# Patient Record
Sex: FEMALE | Race: OTHER | NOT HISPANIC OR LATINO
[De-identification: names, ages, dates, MRNs, and addresses within clinical notes are randomized per-mention and may not be internally consistent; named-entity substitution may affect disease eponyms.]

---

## 2017-04-04 ENCOUNTER — APPOINTMENT (OUTPATIENT)
Dept: INTERNAL MEDICINE | Facility: CLINIC | Age: 22
End: 2017-04-04

## 2017-04-04 VITALS
TEMPERATURE: 98.1 F | HEART RATE: 78 BPM | HEIGHT: 64 IN | DIASTOLIC BLOOD PRESSURE: 70 MMHG | WEIGHT: 204 LBS | BODY MASS INDEX: 34.83 KG/M2 | SYSTOLIC BLOOD PRESSURE: 115 MMHG

## 2017-04-04 DIAGNOSIS — Z78.9 OTHER SPECIFIED HEALTH STATUS: ICD-10-CM

## 2017-04-04 DIAGNOSIS — Z82.69 FAMILY HISTORY OF OTHER DISEASES OF THE MUSCULOSKELETAL SYSTEM AND CONNECTIVE TISSUE: ICD-10-CM

## 2017-04-04 DIAGNOSIS — Z00.00 ENCOUNTER FOR GENERAL ADULT MEDICAL EXAMINATION W/OUT ABNORMAL FINDINGS: ICD-10-CM

## 2017-04-04 DIAGNOSIS — Z83.3 FAMILY HISTORY OF DIABETES MELLITUS: ICD-10-CM

## 2017-04-04 DIAGNOSIS — Z11.3 ENCOUNTER FOR SCREENING FOR INFECTIONS WITH A PREDOMINANTLY SEXUAL MODE OF TRANSMISSION: ICD-10-CM

## 2017-04-05 LAB
25(OH)D3 SERPL-MCNC: 15.3 NG/ML
ALBUMIN SERPL ELPH-MCNC: 4.3 G/DL
ALP BLD-CCNC: 40 U/L
ALT SERPL-CCNC: 15 U/L
ANION GAP SERPL CALC-SCNC: 15 MMOL/L
AST SERPL-CCNC: 14 U/L
BASOPHILS # BLD AUTO: 0.02 K/UL
BASOPHILS NFR BLD AUTO: 0.2 %
BILIRUB SERPL-MCNC: 0.5 MG/DL
BUN SERPL-MCNC: 10 MG/DL
CALCIUM SERPL-MCNC: 9.5 MG/DL
CHLORIDE SERPL-SCNC: 101 MMOL/L
CHOLEST SERPL-MCNC: 167 MG/DL
CHOLEST/HDLC SERPL: 3.2 RATIO
CO2 SERPL-SCNC: 23 MMOL/L
CREAT SERPL-MCNC: 0.59 MG/DL
EOSINOPHIL # BLD AUTO: 0.07 K/UL
EOSINOPHIL NFR BLD AUTO: 0.7 %
GLUCOSE SERPL-MCNC: 88 MG/DL
HBA1C MFR BLD HPLC: 6 %
HBV SURFACE AB SER QL: NONREACTIVE
HBV SURFACE AG SER QL: NONREACTIVE
HCT VFR BLD CALC: 37.9 %
HCV AB SER QL: NONREACTIVE
HCV S/CO RATIO: 0.39 S/CO
HDLC SERPL-MCNC: 53 MG/DL
HGB BLD-MCNC: 12 G/DL
HIV1+2 AB SPEC QL IA.RAPID: NONREACTIVE
IMM GRANULOCYTES NFR BLD AUTO: 0.2 %
LDLC SERPL CALC-MCNC: 84 MG/DL
LYMPHOCYTES # BLD AUTO: 3.1 K/UL
LYMPHOCYTES NFR BLD AUTO: 30.3 %
MAN DIFF?: NORMAL
MCHC RBC-ENTMCNC: 27 PG
MCHC RBC-ENTMCNC: 31.7 GM/DL
MCV RBC AUTO: 85.4 FL
MONOCYTES # BLD AUTO: 0.43 K/UL
MONOCYTES NFR BLD AUTO: 4.2 %
NEUTROPHILS # BLD AUTO: 6.58 K/UL
NEUTROPHILS NFR BLD AUTO: 64.4 %
PLATELET # BLD AUTO: 394 K/UL
POTASSIUM SERPL-SCNC: 4.4 MMOL/L
PROT SERPL-MCNC: 7.5 G/DL
RBC # BLD: 4.44 M/UL
RBC # FLD: 13.8 %
SODIUM SERPL-SCNC: 139 MMOL/L
T PALLIDUM AB SER QL IA: NEGATIVE
TRIGL SERPL-MCNC: 152 MG/DL
TSH SERPL-ACNC: 3.24 UIU/ML
VIT B12 SERPL-MCNC: 334 PG/ML
WBC # FLD AUTO: 10.22 K/UL

## 2017-04-19 ENCOUNTER — APPOINTMENT (OUTPATIENT)
Dept: NEUROLOGY | Facility: CLINIC | Age: 22
End: 2017-04-19

## 2017-04-19 VITALS
DIASTOLIC BLOOD PRESSURE: 81 MMHG | WEIGHT: 203 LBS | HEIGHT: 62 IN | HEART RATE: 77 BPM | SYSTOLIC BLOOD PRESSURE: 125 MMHG | BODY MASS INDEX: 37.36 KG/M2

## 2017-04-19 DIAGNOSIS — G47.00 INSOMNIA, UNSPECIFIED: ICD-10-CM

## 2017-04-19 DIAGNOSIS — Z87.42 PERSONAL HISTORY OF OTHER DISEASES OF THE FEMALE GENITAL TRACT: ICD-10-CM

## 2017-07-06 ENCOUNTER — APPOINTMENT (OUTPATIENT)
Dept: INTERNAL MEDICINE | Facility: CLINIC | Age: 22
End: 2017-07-06

## 2017-11-17 ENCOUNTER — RESULT REVIEW (OUTPATIENT)
Age: 22
End: 2017-11-17

## 2018-11-05 ENCOUNTER — MOBILE ON CALL (OUTPATIENT)
Age: 23
End: 2018-11-05

## 2018-11-27 ENCOUNTER — RESULT REVIEW (OUTPATIENT)
Age: 23
End: 2018-11-27

## 2018-12-02 ENCOUNTER — EMERGENCY (EMERGENCY)
Facility: HOSPITAL | Age: 23
LOS: 1 days | Discharge: HOME | End: 2018-12-02
Attending: EMERGENCY MEDICINE
Payer: COMMERCIAL

## 2018-12-02 VITALS
TEMPERATURE: 98 F | OXYGEN SATURATION: 99 % | RESPIRATION RATE: 17 BRPM | DIASTOLIC BLOOD PRESSURE: 80 MMHG | HEART RATE: 77 BPM | SYSTOLIC BLOOD PRESSURE: 127 MMHG

## 2018-12-02 VITALS
OXYGEN SATURATION: 100 % | RESPIRATION RATE: 16 BRPM | SYSTOLIC BLOOD PRESSURE: 127 MMHG | DIASTOLIC BLOOD PRESSURE: 87 MMHG | HEART RATE: 68 BPM

## 2018-12-02 LAB
APPEARANCE UR: ABNORMAL
BACTERIA # UR AUTO: ABNORMAL
BILIRUB UR-MCNC: NEGATIVE — SIGNIFICANT CHANGE UP
COLOR SPEC: YELLOW — SIGNIFICANT CHANGE UP
DIFF PNL FLD: ABNORMAL
EPI CELLS # UR: 23 /HPF — HIGH
GLUCOSE UR QL: NEGATIVE — SIGNIFICANT CHANGE UP
HYALINE CASTS # UR AUTO: 0 /LPF — SIGNIFICANT CHANGE UP (ref 0–7)
KETONES UR-MCNC: NEGATIVE — SIGNIFICANT CHANGE UP
LEUKOCYTE ESTERASE UR-ACNC: ABNORMAL
NITRITE UR-MCNC: NEGATIVE — SIGNIFICANT CHANGE UP
PH UR: 6 — SIGNIFICANT CHANGE UP (ref 5–8)
PROT UR-MCNC: ABNORMAL
RBC CASTS # UR COMP ASSIST: 11 /HPF — HIGH (ref 0–4)
SP GR SPEC: 1.04 — HIGH (ref 1.01–1.02)
UROBILINOGEN FLD QL: NEGATIVE — SIGNIFICANT CHANGE UP
WBC UR QL: 30 /HPF — HIGH (ref 0–5)

## 2018-12-02 PROCEDURE — 96372 THER/PROPH/DIAG INJ SC/IM: CPT

## 2018-12-02 PROCEDURE — 99283 EMERGENCY DEPT VISIT LOW MDM: CPT | Mod: 25

## 2018-12-02 PROCEDURE — 81001 URINALYSIS AUTO W/SCOPE: CPT

## 2018-12-02 PROCEDURE — 99283 EMERGENCY DEPT VISIT LOW MDM: CPT

## 2018-12-02 RX ORDER — KETOROLAC TROMETHAMINE 30 MG/ML
30 SYRINGE (ML) INJECTION ONCE
Qty: 0 | Refills: 0 | Status: DISCONTINUED | OUTPATIENT
Start: 2018-12-02 | End: 2018-12-02

## 2018-12-02 RX ADMIN — Medication 30 MILLIGRAM(S): at 15:48

## 2018-12-02 NOTE — ED PROVIDER NOTE - MEDICAL DECISION MAKING DETAILS
MD Juni,Attending: pt seen. agree with above HPI/ROS/PE. appears well with normal neuro exam. No sxs except L sided and posterior headache. different than migraine sxss for her. Prior MRI showed "cyst"--? type. No urgent imaging needed. No infectious sxs. Good cervical range of motion. for symptomatic RX for what sounds like tension-type headache. Has neurologist and will followup this week. MD Juni,Attending: pt seen. agree with above HPI/ROS/PE. appears well with normal neuro exam. No sxs except L sided and posterior headache. different than migraine sxss for her. Prior MRI showed "cyst"--? type. No urgent imaging needed. No infectious sxs. Good cervical range of motion. for symptomatic RX for what sounds like tension-type headache. Has neurologist and will followup this week.    EM PGY1 Rita Rosas MD: 23F with PMHx of migraine, presents to the ED with complaints of L sided headache onset yesterday with secondary neck pain. Pt hemodynamically stable, afebrile. No neck stiffness, no FNDs. Pt is well appearing. Unlike usual migraines, likely tension HA affecting left frontooccipital region. Plan: pain control, f/u with neurologist MD Juni,Attending: pt seen. agree with above HPI/ROS/PE. appears well with normal neuro exam. No sxs except L sided and posterior headache. different than migraine sxss for her. Prior MRI showed "cyst"--? type. No urgent imaging needed. No infectious sxs. Good cervical range of motion. for symptomatic RX for what sounds like tension-type headache. Has neurologist and will followup this week.    EM PGY1 Rita Rosas MD: 23F with PMHx of migraine, presents to the ED with complaints of L sided headache onset yesterday with secondary neck pain. Pt hemodynamically stable, afebrile. No neck stiffness, no neuro findingss. Pt is well appearing. Unlike usual migraines, likely tension HA affecting left frontooccipital region. Plan: pain control, f/u with neurologist

## 2018-12-02 NOTE — ED PROVIDER NOTE - PLAN OF CARE
Thank you for visiting our Emergency Department, it has been a pleasure taking part in your healthcare.   You have been seen for headache.  You were treated with Toradol      Please take all discharge medications as prescribed below:    Take Motrin/Tylenol for pain as needed.  Take Motrin 600 mg (three 200 mg over the counter pills) every 8 hours as needed for moderate pain--take with food. Do not take this medication if you have a bleeding disorder, stomach or GI ulcer problems or liver disease. Take 2 regular strength (650mg) or 1 extra strength (500mg) of Tylenol. If you have any questions please consult a pharmacist or your PMD.     Follow up with your primary care provider in 24-48 hours.   Please return to the Emergency Department if you experience any new/worsening symptoms, including but are not limited to: unrelenting nausea, vomiting, syncope, headache that does not resolve, numbness or tingling, loss of sensation, loss of motor function, or any other concerning symptoms.

## 2018-12-02 NOTE — ED PROVIDER NOTE - CARE PLAN
Principal Discharge DX:	Acute non intractable tension-type headache Principal Discharge DX:	Acute non intractable tension-type headache  Assessment and plan of treatment:	Thank you for visiting our Emergency Department, it has been a pleasure taking part in your healthcare.   You have been seen for headache.  You were treated with Toradol      Please take all discharge medications as prescribed below:    Take Motrin/Tylenol for pain as needed.  Take Motrin 600 mg (three 200 mg over the counter pills) every 8 hours as needed for moderate pain--take with food. Do not take this medication if you have a bleeding disorder, stomach or GI ulcer problems or liver disease. Take 2 regular strength (650mg) or 1 extra strength (500mg) of Tylenol. If you have any questions please consult a pharmacist or your PMD.     Follow up with your primary care provider in 24-48 hours.   Please return to the Emergency Department if you experience any new/worsening symptoms, including but are not limited to: unrelenting nausea, vomiting, syncope, headache that does not resolve, numbness or tingling, loss of sensation, loss of motor function, or any other concerning symptoms.

## 2018-12-02 NOTE — ED ADULT NURSE NOTE - NSIMPLEMENTINTERV_GEN_ALL_ED
Implemented All Universal Safety Interventions:  [place] to call system. Call bell, personal items and telephone within reach. Instruct patient to call for assistance. Room bathroom lighting operational. Non-slip footwear when patient is off stretcher. Physically safe environment: no spills, clutter or unnecessary equipment. Stretcher in lowest position, wheels locked, appropriate side rails in place.

## 2018-12-02 NOTE — ED PROVIDER NOTE - CHPI ED SYMPTOMS NEG
no dyspnea, no dysuria, no hematuria, no recent travel, no numbness/tingling, no difficulty ambulating, no chest pain/no weakness/no fever/no blurred vision

## 2018-12-02 NOTE — ED ADULT NURSE NOTE - OBJECTIVE STATEMENT
Pt presents to ED awake, alert and ambulatory, c/o headache. Pt states her pain began yesterday and is located on the left side of her head, has since radiated to her neck and jaw. Pt states this pain does not feel like her normal migraines and were only mildly relieved with Excedrin. Pt reports nausea, vomiting and epistaxis yesterday. Pt had a recent MRI which showed a cyst. Full ROM in neck. No changes in vision, fever. Respirations even and unlabored.

## 2018-12-02 NOTE — ED PROVIDER NOTE - PROGRESS NOTE DETAILS
EM PGY1 Rita Rosas MD: Pt assessed at beside. Pt resting comfortably, pain controlled, pt questions answered. Vital signs stable. Will d/c. Follow up instructions given, importance of follow up emphasized, return to ED parameters reviewed and patient verbalized understanding.  All questions answered, all concerns addressed.

## 2018-12-02 NOTE — ED PROVIDER NOTE - OBJECTIVE STATEMENT
23F with PMHx of migraine, presents to the ED with complaints of L sided tension headache onset yesterday with secondary neck pain. Pt rates pain at 7 out of 10 and notes pain is unlike Hx of migraines. Admits to intermittent nausea, emesis and epistaxis yesterday. Recent outpatient MRI remarkable for cyst. Pt follows up with neurologist. Pt took Excedrin Migraine with mild relief yesterday. Notes that when she yawns, pain is exacerbated in her jaw. Denies fever, blurred vision, dyspnea, dysuria, hematuria, recent travel, weakness, numbness/tingling, difficulty ambulating, chest pain, or any other complaints at this time. LNMP Nov 16th, 2018. Sexually active. Denies Hx of pregnancy. Pt endorses Hx of thyroid US, unremarkable. 23F with PMHx of migraine, presents to the ED with complaints of L sided headache onset yesterday with secondary neck pain. Pt rates pain at 7 out of 10 and notes pain is unlike Hx of migraines. Admits to intermittent nausea, emesis and epistaxis yesterday. Recent outpatient MRI remarkable for cyst. Pt follows up with neurologist. Pt took Excedrin Migraine with mild relief yesterday. Notes that when she yawns, pain is exacerbated in her jaw. Denies fever, blurred vision, dyspnea, dysuria, hematuria, recent travel, weakness, numbness/tingling, difficulty ambulating, chest pain, or any other complaints at this time. LNMP Nov 16th, 2018. Sexually active. Denies Hx of pregnancy. Pt endorses Hx of thyroid US, unremarkable.

## 2020-02-27 ENCOUNTER — RESULT REVIEW (OUTPATIENT)
Age: 25
End: 2020-02-27

## 2020-06-22 ENCOUNTER — TRANSCRIPTION ENCOUNTER (OUTPATIENT)
Age: 25
End: 2020-06-22

## 2021-05-05 ENCOUNTER — RESULT REVIEW (OUTPATIENT)
Age: 26
End: 2021-05-05

## 2021-06-13 ENCOUNTER — EMERGENCY (EMERGENCY)
Facility: HOSPITAL | Age: 26
LOS: 1 days | Discharge: HOME | End: 2021-06-13
Attending: EMERGENCY MEDICINE
Payer: COMMERCIAL

## 2021-06-13 VITALS
SYSTOLIC BLOOD PRESSURE: 147 MMHG | HEART RATE: 87 BPM | DIASTOLIC BLOOD PRESSURE: 92 MMHG | OXYGEN SATURATION: 100 % | RESPIRATION RATE: 16 BRPM | TEMPERATURE: 98 F

## 2021-06-13 VITALS
RESPIRATION RATE: 20 BRPM | OXYGEN SATURATION: 98 % | HEART RATE: 102 BPM | HEIGHT: 63 IN | TEMPERATURE: 100 F | DIASTOLIC BLOOD PRESSURE: 94 MMHG | WEIGHT: 179.9 LBS | SYSTOLIC BLOOD PRESSURE: 144 MMHG

## 2021-06-13 LAB
HPIV3 RNA SPEC QL NAA+PROBE: DETECTED
RAPID RVP RESULT: DETECTED
SARS-COV-2 RNA SPEC QL NAA+PROBE: SIGNIFICANT CHANGE UP

## 2021-06-13 PROCEDURE — 99284 EMERGENCY DEPT VISIT MOD MDM: CPT | Mod: 25

## 2021-06-13 PROCEDURE — 71045 X-RAY EXAM CHEST 1 VIEW: CPT | Mod: 26

## 2021-06-13 PROCEDURE — 94640 AIRWAY INHALATION TREATMENT: CPT

## 2021-06-13 PROCEDURE — 0225U NFCT DS DNA&RNA 21 SARSCOV2: CPT

## 2021-06-13 PROCEDURE — 99284 EMERGENCY DEPT VISIT MOD MDM: CPT

## 2021-06-13 PROCEDURE — 71045 X-RAY EXAM CHEST 1 VIEW: CPT

## 2021-06-13 RX ORDER — FLUTICASONE PROPIONATE 50 MCG
2 SPRAY, SUSPENSION NASAL ONCE
Refills: 0 | Status: COMPLETED | OUTPATIENT
Start: 2021-06-13 | End: 2021-06-13

## 2021-06-13 RX ADMIN — Medication 2 SPRAY(S): at 21:18

## 2021-06-13 NOTE — ED PROVIDER NOTE - CLINICAL SUMMARY MEDICAL DECISION MAKING FREE TEXT BOX
Tunde PGY-3:  27yo F here w/ viral URI sx, fully vaccinated against covid doubt severe covid complications, however could still have covid. For epidemiological purposes will obtain swab, cxr as 6 days of sx to assess for bacterial PNA, if wnl anticipate d/c w/ pcp F/U

## 2021-06-13 NOTE — ED PROVIDER NOTE - NSFOLLOWUPINSTRUCTIONS_ED_ALL_ED_FT
(1) Follow up with your primary care physician as discussed  (2) Immediately seek care at your nearest emergency room if your worsen, persist, or do not resolve   (3) Take Tylenol (up to 1000mg or 1 g)  and/or Motrin (up to 600mg) up to every 6 hours as needed for pain.    YOU WERE SEEN IN THE ED FOR A LIKELY VIRAL ILLNESS.     YOU SHOULD SELF-QUARANTINE FOR 14 DAYS TO AVOID POTENTIAL SPREAD OF THE CORONAVIRUS.     YOU MAY USE TYLENOL AND/OR MOTRIN AS NEEDED FOR PAIN OR FEVERS.    RETURN TO THE ED IF YOU DEVELOP TROUBLE BREATHING.

## 2021-06-13 NOTE — ED ADULT NURSE NOTE - OBJECTIVE STATEMENT
Patient is a 26 yr old female who presents to the ED from home complaining of cold symptoms. Per patient she has been experiencing cold symptoms for the past 5 days and has taken (Z-pac, mucinex) and states she has no relief. Patient states now she has diahhrea and general body aches, with some minor n/v. Patient states her phlegm that she brings up is no longer green but clear, however she still feels uncomfortable. Patient states she has not seen her PMD but did go to  where her rapid covid was negative. Upon assessment, patient is AOx4, afebrile, breathing spontaneously on RA, abdomen soft/non tender, +pulses, skin intact and denies CP. All safety precautions maintains, call bell at bedside and will continue to monitor.

## 2021-06-13 NOTE — ED PROVIDER NOTE - OBJECTIVE STATEMENT
27yo F no pmhx here with runny nose and cough for 6x days    States came in today as she is having trouble sleeping due to congestion in nose despite using mucinex. Also with diarrhea. No f/c. Tolerating PO without issue. No CP, no SOB. 2x covid vaccine 2nd shot in late may.     No meds  no surgeries

## 2021-06-13 NOTE — ED PROVIDER NOTE - ATTENDING CONTRIBUTION TO CARE
Nemes - 27yo F no pmhx here with runny nose and cough for 6x days. Also with diarrhea. No f/c. Tolerating PO without issue. No CP, no SOB. 2x covid vaccine 2nd shot in late may. VS wnl, well appearing, in NAD. Moist mucosae, pink conjunctivae. Neck supple, neuro grossly intact. Lungs clear, cardiac wnl, no JVD. Abdomen soft/NT, no CVAT. No pedal edema, no calf TTP. Likely viral URI, low suspicion for Covid/bacterial PNA. Will get CXR, RVP, DC

## 2021-06-13 NOTE — ED PROVIDER NOTE - NS ED ROS FT
CONSTITUTIONAL: No fevers, no chills  Eyes: Negative  Cardiovascular: No Chest pain  Respiratory: No SOB  Gastrointestinal: No n/v/d, no abd pain  Genitourinary: no dysuria, no hematuria  SKIN: no rashes.  NEURO: no headache, no weakness or numbness  PSYCHIATRIC: no known mental health issues.  Endocrine: No unexplained weight gain

## 2021-06-13 NOTE — ED PROVIDER NOTE - PHYSICAL EXAMINATION
General: well appearing, interactive, well nourished, NAD  HEENT: pupils equal and reactive, normal external ears bilaterally   Cardiac: RRR, no MRG appreciated  Resp: coarse lung sounds throughout all fields, symmetric chest wall rise  Abd: soft, nontender, nondistended,   : no CVA tenderness  Neuro: Moving all extremities  Skin:  normal color for race

## 2021-06-13 NOTE — ED ADULT NURSE REASSESSMENT NOTE - NS ED NURSE REASSESS COMMENT FT1
Rounding on patient complete, no complaints at this time. In no distress. All needs met. Updated on plan of care.

## 2021-12-03 NOTE — ED PROVIDER NOTE - IV ALTEPLASE EXCL REL HIDDEN
[Good] : ~his/her~  mood as  good [No] : No [No falls in past year] : Patient reported no falls in the past year [0] : 2) Feeling down, depressed, or hopeless: Not at all (0) [PHQ-2 Negative - No further assessment needed] : PHQ-2 Negative - No further assessment needed [With Family] : lives with family [Retired] : retired [] :  [Fully functional (bathing, dressing, toileting, transferring, walking, feeding)] : Fully functional (bathing, dressing, toileting, transferring, walking, feeding) [Fully functional (using the telephone, shopping, preparing meals, housekeeping, doing laundry, using] : Fully functional and needs no help or supervision to perform IADLs (using the telephone, shopping, preparing meals, housekeeping, doing laundry, using transportation, managing medications and managing finances) [Reports changes in vision] : Reports changes in vision [With Patient/Caregiver] : , with patient/caregiver [] : No [Audit-CScore] : 0 [de-identified] : Walking  [[id number]] : 0 [Reports changes in hearing] : Reports no changes in hearing [Reports changes in dental health] : Reports no changes in dental health show [AdvancecareDate] : 12/3/21  [FreeTextEntry4] : on file

## 2022-05-09 ENCOUNTER — APPOINTMENT (OUTPATIENT)
Dept: OTOLARYNGOLOGY | Facility: CLINIC | Age: 27
End: 2022-05-09
Payer: COMMERCIAL

## 2022-05-09 VITALS
BODY MASS INDEX: 31.28 KG/M2 | HEART RATE: 86 BPM | WEIGHT: 170 LBS | SYSTOLIC BLOOD PRESSURE: 143 MMHG | HEIGHT: 62 IN | DIASTOLIC BLOOD PRESSURE: 94 MMHG

## 2022-05-09 DIAGNOSIS — H93.291 OTHER ABNORMAL AUDITORY PERCEPTIONS, RIGHT EAR: ICD-10-CM

## 2022-05-09 DIAGNOSIS — G43.109 MIGRAINE WITH AURA, NOT INTRACTABLE, W/OUT STATUS MIGRAINOSUS: ICD-10-CM

## 2022-05-09 PROCEDURE — 99204 OFFICE O/P NEW MOD 45 MIN: CPT

## 2022-05-09 PROCEDURE — 92550 TYMPANOMETRY & REFLEX THRESH: CPT

## 2022-05-09 PROCEDURE — 92557 COMPREHENSIVE HEARING TEST: CPT

## 2022-05-09 NOTE — REVIEW OF SYSTEMS
[Seasonal Allergies] : seasonal allergies [Ear Noises] : ear noises [Anxiety] : anxiety [Negative] : Heme/Lymph [As Noted in HPI] : as noted in HPI [de-identified] : headache

## 2022-05-09 NOTE — DATA REVIEWED
[de-identified] : type A tymps AU\par Hearing -8000 Hz AU\par 1) ENT F/U 2) Re-eval as per MD 3) Further testing as per MD

## 2022-05-09 NOTE — HISTORY OF PRESENT ILLNESS
[de-identified] : 27 yr old female c/o tinnitus AD, worse if she turns her head to the left, for a year\par not pulsatile\par -dizzy\par +Qtips\par -hx otitis, noise exp, head trauma, FH\par +hx migraine

## 2022-05-09 NOTE — ASSESSMENT
[FreeTextEntry1] :   WNL w type A AU\par discussed further eval with ABR or MRI\par plan: ABR\par f/u after testing is complete

## 2022-06-02 ENCOUNTER — RESULT REVIEW (OUTPATIENT)
Age: 27
End: 2022-06-02

## 2022-06-29 ENCOUNTER — APPOINTMENT (OUTPATIENT)
Dept: OTOLARYNGOLOGY | Facility: CLINIC | Age: 27
End: 2022-06-29

## 2022-06-29 PROCEDURE — 92653 AEP NEURODIAGNOSTIC I&R: CPT

## 2022-07-11 ENCOUNTER — NON-APPOINTMENT (OUTPATIENT)
Age: 27
End: 2022-07-11

## 2022-07-18 ENCOUNTER — RESULT REVIEW (OUTPATIENT)
Age: 27
End: 2022-07-18

## 2023-05-01 NOTE — ED POST DISCHARGE NOTE - NS ED POST DC CALL 1
Patient wants to only take the Tramadol and not the Norco because she is afraid to get addicted.  Will forward the request to Tami.   Patient contacted

## 2023-09-07 ENCOUNTER — APPOINTMENT (OUTPATIENT)
Dept: ULTRASOUND IMAGING | Facility: CLINIC | Age: 28
End: 2023-09-07

## 2023-09-18 ENCOUNTER — APPOINTMENT (OUTPATIENT)
Dept: ULTRASOUND IMAGING | Facility: CLINIC | Age: 28
End: 2023-09-18
Payer: COMMERCIAL

## 2023-09-18 ENCOUNTER — OUTPATIENT (OUTPATIENT)
Dept: OUTPATIENT SERVICES | Facility: HOSPITAL | Age: 28
LOS: 1 days | End: 2023-09-18
Payer: COMMERCIAL

## 2023-09-18 DIAGNOSIS — Z13.29 ENCOUNTER FOR SCREENING FOR OTHER SUSPECTED ENDOCRINE DISORDER: ICD-10-CM

## 2023-09-18 PROCEDURE — 76536 US EXAM OF HEAD AND NECK: CPT | Mod: 26

## 2023-09-18 PROCEDURE — 76536 US EXAM OF HEAD AND NECK: CPT

## 2024-01-11 ENCOUNTER — APPOINTMENT (OUTPATIENT)
Dept: INTERNAL MEDICINE | Facility: CLINIC | Age: 29
End: 2024-01-11
Payer: COMMERCIAL

## 2024-01-11 VITALS
SYSTOLIC BLOOD PRESSURE: 140 MMHG | TEMPERATURE: 97.8 F | WEIGHT: 168 LBS | HEART RATE: 98 BPM | RESPIRATION RATE: 17 BRPM | BODY MASS INDEX: 30.91 KG/M2 | OXYGEN SATURATION: 97 % | DIASTOLIC BLOOD PRESSURE: 90 MMHG | HEIGHT: 62 IN

## 2024-01-11 DIAGNOSIS — Z78.9 OTHER SPECIFIED HEALTH STATUS: ICD-10-CM

## 2024-01-11 DIAGNOSIS — H93.11 TINNITUS, RIGHT EAR: ICD-10-CM

## 2024-01-11 DIAGNOSIS — D64.9 ANEMIA, UNSPECIFIED: ICD-10-CM

## 2024-01-11 DIAGNOSIS — Z82.49 FAMILY HISTORY OF ISCHEMIC HEART DISEASE AND OTHER DISEASES OF THE CIRCULATORY SYSTEM: ICD-10-CM

## 2024-01-11 DIAGNOSIS — G44.221 CHRONIC TENSION-TYPE HEADACHE, INTRACTABLE: ICD-10-CM

## 2024-01-11 DIAGNOSIS — R73.03 PREDIABETES.: ICD-10-CM

## 2024-01-11 PROCEDURE — 99214 OFFICE O/P EST MOD 30 MIN: CPT

## 2024-01-11 RX ORDER — NORGESTIMATE AND ETHINYL ESTRADIOL 7DAYSX3 28
0.18/0.215/0.25 KIT ORAL
Refills: 0 | Status: ACTIVE | COMMUNITY

## 2025-05-07 ENCOUNTER — RESULT REVIEW (OUTPATIENT)
Age: 30
End: 2025-05-07